# Patient Record
Sex: FEMALE | Race: WHITE | Employment: OTHER | URBAN - METROPOLITAN AREA
[De-identification: names, ages, dates, MRNs, and addresses within clinical notes are randomized per-mention and may not be internally consistent; named-entity substitution may affect disease eponyms.]

---

## 2017-01-01 ENCOUNTER — NURSING HOME VISIT (OUTPATIENT)
Dept: GERIATRICS | Facility: CLINIC | Age: 82
End: 2017-01-01
Payer: COMMERCIAL

## 2017-01-01 VITALS
DIASTOLIC BLOOD PRESSURE: 91 MMHG | RESPIRATION RATE: 16 BRPM | OXYGEN SATURATION: 95 % | WEIGHT: 129.7 LBS | HEART RATE: 91 BPM | TEMPERATURE: 98.9 F | SYSTOLIC BLOOD PRESSURE: 186 MMHG

## 2017-01-01 DIAGNOSIS — I71.40 ABDOMINAL AORTIC ANEURYSM (AAA) WITHOUT RUPTURE (H): ICD-10-CM

## 2017-01-01 DIAGNOSIS — R55 SYNCOPE, UNSPECIFIED SYNCOPE TYPE: ICD-10-CM

## 2017-01-01 DIAGNOSIS — G89.29 CHRONIC BILATERAL LOW BACK PAIN WITHOUT SCIATICA: ICD-10-CM

## 2017-01-01 DIAGNOSIS — I10 BENIGN ESSENTIAL HYPERTENSION: Primary | ICD-10-CM

## 2017-01-01 DIAGNOSIS — M54.50 CHRONIC BILATERAL LOW BACK PAIN WITHOUT SCIATICA: ICD-10-CM

## 2017-01-01 PROCEDURE — 99310 SBSQ NF CARE HIGH MDM 45: CPT | Performed by: NURSE PRACTITIONER

## 2017-01-01 RX ORDER — POLYETHYLENE GLYCOL 3350 17 G/17G
17 POWDER, FOR SOLUTION ORAL DAILY PRN
COMMUNITY

## 2017-01-01 RX ORDER — CYANOCOBALAMIN 1000 UG/ML
1 INJECTION, SOLUTION INTRAMUSCULAR; SUBCUTANEOUS
COMMUNITY
End: 2018-01-01

## 2017-12-21 NOTE — PROGRESS NOTES
[facility]  PRIMARY CARE PROVIDER AND CLINIC:  Francoise Whitmore [address] [place] [address] [address] / [place] [address]  Chief Complaint   Patient presents with     Hospital F/U     Clinic Care Coordination - Initial       HPI:    Leigh López is a 89 year old  (3/20/1925),admitted to the [facility]   from [facility].  Hospital stay 12/16/17 through 12/20/17 after a syncopal episode at home. Known hx of large AAA s/p repair x 2 but still problematic, HTN and recurrent sycope.  Admitted to this facility for  rehab, medical management and nursing care.  HPI information obtained from: facility chart records, facility staff, patient report, [facility] chart review and Care Everywhere [facility] chart review.  Current issues are:      Benign essential hypertension/Abdominal aortic aneurysm (AAA) without rupture (H)  Goal it to keep BP lower than 140 - acknowledge syncope episodoes may be hypoprofusion related and a higher bp would help with that - difficulty balancing    Syncope, unspecified syncope type  W/u un defined - goal is more comfort care    Chronic bilateral low back pain without sciatica  Chronic - acknowledge this might be AAA related but does appear more MS on exam.   Discussed pain medication treatment options.       CODE STATUS/ADVANCE DIRECTIVES DISCUSSION:   DNR / DNI  Patient's living condition: lives alone    ALLERGIES:Diltiazem hcl [diltiazem]; Metoprolol succinate [metoprolol]; and Sulfa drugs  PAST MEDICAL HISTORY:  has no past medical history on file.  PAST SURGICAL HISTORY:  has no past surgical history on file.  FAMILY HISTORY: family history is not on file.  SOCIAL HISTORY:      Post Discharge Medication Reconciliation Status: discharge medications reconciled, continue medications without change.  Current Outpatient Prescriptions   Medication Sig Dispense Refill     Irbesartan (AVAPRO PO) Take 150 mg by mouth daily       cyanocobalamin (VITAMIN  B12) 1000 MCG/ML injection Inject 1 mL into the muscle every 30 days       HYDRALAZINE HCL PO Take 100 mg by mouth 3 times daily       polyethylene glycol (MIRALAX/GLYCOLAX) powder Take 17 g by mouth daily as needed for constipation       Multiple Vitamins-Minerals (MULTIVITAMIN ADULTS PO) Take 1 tablet by mouth daily       Nitroglycerin (NITROSTAT SL) Place 0.4 mg under the tongue every 5 minutes as needed for chest pain       AmLODIPine Besylate (NORVASC PO) Take 10 mg by mouth daily       Calcium carb-Vitamin D 500 mg [place]-200 units (OSCAL WITH D;OYSTER SHELL CALCIUM) 500-200 MG-UNIT per tablet Take 1 tablet by mouth daily       Levothyroxine Sodium (SYNTHROID PO) Take 50 mcg by mouth daily       Acetaminophen (TYLENOL EXTRA STRENGTH PO) Take 1,000 mg by mouth 3 times daily Also QD PRN       Ascorbic Acid (VITAMIN C PO) Take 500 mg by mouth 2 times daily       Menthol, Topical Analgesic, (BIOFREEZE EX) Externally apply topically 2 times daily as needed (BID x 10 days)       CLONIDINE HCL PO Take 0.1 mg by mouth 2 times daily as needed         ROS:  10 point ROS of systems including Constitutional, Eyes, Respiratory, Cardiovascular, Gastroenterology, Genitourinary, Integumentary, Muscularskeletal, Psychiatric were all negative except for pertinent positives noted in my HPI.    Exam:  BP (!) 186/91  Pulse 91  Temp 98.9  F (37.2  C)  Resp 16  Wt 129 lb 11.2 oz (58.8 kg)  SpO2 95%  GENERAL APPEARANCE:  Alert, in no distress  RESP:  respiratory effort and palpation of chest normal, auscultation of lungs clear , no respiratory distress  CV:  Palpation and auscultation of heart done , rate and rhythm reg, 3/6murmur, no peripheral edema  ABDOMEN:  normal bowel sounds, soft, nontender, no hepatosplenomegaly or other masses  M/S:   Gait and station indep, Digits and nails normal  SKIN:  Inspection and Palpation of skin and subcutaneous tissue intact  NEURO: 2-12 in normal limits and at patient's baseline  PSYCH:   insight and judgement, memory good , affect and mood normal      Lab/Diagnostic data:  [facility]:  CBC WITH DIFFERENTIAL AND PLATELET COUNT  Collection Time: 12/16/17 4:00 PM  Result Value Ref Range  WBC COUNT 7.5 3.7 - 12.1  K/UL  RBC COUNT 3.88 3.76 - 5.39  M/UL  HEMOGLOBIN 11.3 11.2 - 15.8  GM/DL  HEMATOCRIT 33.3 (L) 33.9 - 47.5 %  MCV 85.7 80.6 - 98.5 FL  MCH 29.1 26.4 - 32.9 PG  MCHC 34.0 32.0 - 36.0 %  RDW 14.9 10.0 - 16.8 %  PLATELETS 242 179 - 450  K/UL  MPV 8.4 7.4 - 10.4 FL  % NEUTRO 85.8 (H) 43 - 80 %  % LYMPHS 7.5 (L) 16.0 - 49.0 %  % MONOCYTE 5.7 0.0 - 10.0 %  % EOS 0.5 0 - 7 %  % BASO 0.5 0 - 2 %  ABS NEUTROPHIL  (ANC)  6.4 1.8 - 9.2 K/UL  ABS LYMPH 0.6 (L) 1.2 - 3.9 K/UL  ABS MONOCYTE 0.4 0.0 - 1.1 K/UL  ABS EOS 0.0 0.0 - 0.8 K/UL  ABS BASO 0.0 0.0 - 0.2 K/UL    COMPREHENSIVE METABOLIC PANEL  Collection Time: 12/16/17 4:00 PM  Result Value Ref Range  GLUCOSE 124 (H) 70 - 100  MG/DL  BLOOD UREA  NITROGEN  21.9 (H) 10.0 - 20.0  MG/DL  CREATININE 1.52 (H) 0.57 - 1.11  MG/DL  GFR, EST (OTHER  RACES)  32  GFR,EST(  AMERICAN)  39  BUN/CREAT RATIO 14.4 11.7 - 22.9  SODIUM 135 (L) 136 - 146  MMOL/L  POTASSIUM 4.6 3.5 - 5.1  MMOL/L  CHLORIDE 103 98 - 107  MMOL/L  CO2 22.0  22.0 - 29.0  MMOL/L  CALCIUM 9.7 8.6 - 10.5  MG/DL  TOTAL PROTEIN 7.4 6.0 - 8.0  GM/DL  ALBUMIN 4.0 3.4 - 4.8  GM/DL  GLOBULIN 3.4 2.0 - 3.5  GM/DL  A/G RATIO 1.2 1.0 - 2.0  SGPT (ALT) 11 8 - 45 U/L  SGOT (AST) 15 5 - 41 U/L  ALK P'TASE 105 36 - 150 U/L  TOTAL BILI 0.5 0.2 - 1.2  MG/DL  ANION GAP 14.6 10.0 - 20.0    LIPASE  Collection Time: 12/16/17 4:00 PM  Result Value Ref Range  LIPASE 29 8 - 78 U/L  TROPONIN I  Collection Time: 12/16/17 4:00 PM  Result Value Ref Range  TROPONIN-I 0.03 0.00 - 0.29  NG/ML    URINE CHEM STRIP WITH REFLEX TO MICROSCOPIC  IF INDICATED  Collection Time: 12/16/17 6:39 PM  Result Value Ref Range  COLOR YELLOW  RUDY HAZY  UR SPECIFIC  GRAVITY  1.015 1.015 - 1.025  UR GLUCOSE NEGATIVE NEG  UR BILI  NEGATIVE NEG  KETONE NEGATIVE NEG  UR OCCULT BLOOD NEGATIVE NEG  URINE PH 5.0 5.0 - 7.0  UR ALBUMIN NEGATIVE NEG  UROBIL <2.0 <2.0 EU/DL  NITRITE NEGATIVE NEG  LEUKO EST 1+ (A) NEG    MICROSCOPIC URINE  Collection Time: 12/16/17 6:39 PM  Result Value Ref Range  UR WBC 20 (H) 0 - 5 /HPF  UR RBC 2 0 - 2 /HPF  BACTERIA TRACE (A) NEG /HPF  SQUAMOUS  EPITHELIAL  5 0 - 5 /HFP  MUCOUS THREADS OCCASIONAL /HPF  HYALINE CAST 11-25 (A) NEG /LPF    ASSESSMENT/PLAN:  Benign essential hypertension/Abdominal aortic aneurysm (AAA) without rupture (H)  Too high in TCU so far  Will increase hydralazine and add prn clonodine    Syncope, unspecified syncope type  Cont to monitor  PT eval    Chronic bilateral low back pain without sciatica  Will treat with tylenol and biofreeze       Orders:  1.  Start Tylenol 1000 mg po TID and 1000 mg po QD PRN.  Dx: pain/mild fever  2.  Start Biofreeze topical to back BID x 10 days, then BID PRN.  Dx: back pain  3.  Clarify Cranberry tab - 500 mg po BID.  Dx: UTI prevention  4.  Clarify fish oil/fatty acid - dose per pharmacy available  1200 mg or pharmacy equivalent.  Dx: supplement  5.  Nitro clarify  - give Q5 min x 3 PRN.  Dx: chest pain  6.  Increase Hydralazine to 100 mg po TID.  Dx: HTN  7.  Add/start Clonidine 0.1 mg po BID PRN - SBP > 150 when BP's checked on routine check.      Total time spent with patient visit at the skilled nursing facility was 40 min including patient visit, review of past records and phone call to patient contact. Greater than 50% of total time spent with counseling and coordinating care due to chart review     Call to son and message left with my direct call back # given.     Electronically signed by:  DAOMN Sepulveda CNP

## 2017-12-22 PROBLEM — E86.0 DEHYDRATION: Status: ACTIVE | Noted: 2017-01-01

## 2017-12-22 PROBLEM — R52 PAIN: Status: ACTIVE | Noted: 2017-01-01

## 2017-12-22 PROBLEM — R53.1 WEAKNESS: Status: ACTIVE | Noted: 2017-01-01

## 2017-12-22 PROBLEM — R07.9 CHEST PAIN: Status: ACTIVE | Noted: 2017-01-01

## 2017-12-22 PROBLEM — I10 ESSENTIAL (PRIMARY) HYPERTENSION: Status: ACTIVE | Noted: 2017-01-01

## 2017-12-22 PROBLEM — N17.9 ACUTE KIDNEY FAILURE (H): Status: ACTIVE | Noted: 2017-01-01

## 2017-12-22 PROBLEM — R00.1 BRADYCARDIA: Status: ACTIVE | Noted: 2017-01-01

## 2017-12-22 PROBLEM — R55 SYNCOPE AND COLLAPSE: Status: ACTIVE | Noted: 2017-01-01

## 2017-12-22 PROBLEM — Z86.79: Status: ACTIVE | Noted: 2017-01-01

## 2017-12-22 PROBLEM — R26.2 DIFFICULTY IN WALKING, NOT ELSEWHERE CLASSIFIED: Status: ACTIVE | Noted: 2017-01-01

## 2017-12-22 PROBLEM — K59.00 CONSTIPATION, UNSPECIFIED: Status: ACTIVE | Noted: 2017-01-01

## 2017-12-22 PROBLEM — E44.0 MODERATE PROTEIN-CALORIE MALNUTRITION (H): Status: ACTIVE | Noted: 2017-01-01

## 2017-12-22 PROBLEM — E03.9 HYPOTHYROIDISM: Status: ACTIVE | Noted: 2017-01-01

## 2017-12-22 PROBLEM — Z87.440 PERSONAL HISTORY OF URINARY TRACT INFECTION: Status: ACTIVE | Noted: 2017-01-01

## 2017-12-22 NOTE — LETTER
12/22/2017        RE: Leigh López  [address] [facility] [address]        [facility]  PRIMARY CARE PROVIDER AND CLINIC:  Francoise Whitmore [address] [place] [address] / [place] [address]  Chief Complaint   Patient presents with     Hospital F/U     Clinic Care Coordination - Initial       HPI:    Leigh López is a 89 year old  (3/20/1925),admitted to the [facility]   from [facility].  Hospital stay 12/16/17 through 12/20/17 after a syncopal episode at home. Known hx of large AAA s/p repair x 2 but still problematic, HTN and recurrent sycope.  Admitted to this facility for  rehab, medical management and nursing care.  HPI information obtained from: facility chart records, facility staff, patient report, [facility] chart review and Care Everywhere [facility] chart review.  Current issues are:      Benign essential hypertension/Abdominal aortic aneurysm (AAA) without rupture (H)  Goal it to keep BP lower than 140 - acknowledge syncope episodoes may be hypoprofusion related and a higher bp would help with that - difficulty balancing    Syncope, unspecified syncope type  W/u un defined - goal is more comfort care    Chronic bilateral low back pain without sciatica  Chronic - acknowledge this might be AAA related but does appear more MS on exam.   Discussed pain medication treatment options.       CODE STATUS/ADVANCE DIRECTIVES DISCUSSION:   DNR / DNI  Patient's living condition: lives alone    ALLERGIES:Diltiazem hcl [diltiazem]; Metoprolol succinate [metoprolol]; and Sulfa drugs  PAST MEDICAL HISTORY:  has no past medical history on file.  PAST SURGICAL HISTORY:  has no past surgical history on file.  FAMILY HISTORY: family history is not on file.  SOCIAL HISTORY:      Post Discharge Medication Reconciliation Status: discharge medications reconciled, continue medications without change.  Current Outpatient Prescriptions   Medication Sig Dispense Refill      Irbesartan (AVAPRO PO) Take 150 mg by mouth daily       cyanocobalamin (VITAMIN B12) 1000 MCG/ML injection Inject 1 mL into the muscle every 30 days       HYDRALAZINE HCL PO Take 100 mg by mouth 3 times daily       polyethylene glycol (MIRALAX/GLYCOLAX) powder Take 17 g by mouth daily as needed for constipation       Multiple Vitamins-Minerals (MULTIVITAMIN ADULTS PO) Take 1 tablet by mouth daily       Nitroglycerin (NITROSTAT SL) Place 0.4 mg under the tongue every 5 minutes as needed for chest pain       AmLODIPine Besylate (NORVASC PO) Take 10 mg by mouth daily       Calcium carb-Vitamin D 500 mg [place]-200 units (OSCAL WITH D;OYSTER SHELL CALCIUM) 500-200 MG-UNIT per tablet Take 1 tablet by mouth daily       Levothyroxine Sodium (SYNTHROID PO) Take 50 mcg by mouth daily       Acetaminophen (TYLENOL EXTRA STRENGTH PO) Take 1,000 mg by mouth 3 times daily Also QD PRN       Ascorbic Acid (VITAMIN C PO) Take 500 mg by mouth 2 times daily       Menthol, Topical Analgesic, (BIOFREEZE EX) Externally apply topically 2 times daily as needed (BID x 10 days)       CLONIDINE HCL PO Take 0.1 mg by mouth 2 times daily as needed         ROS:  10 point ROS of systems including Constitutional, Eyes, Respiratory, Cardiovascular, Gastroenterology, Genitourinary, Integumentary, Muscularskeletal, Psychiatric were all negative except for pertinent positives noted in my HPI.    Exam:  BP (!) 186/91  Pulse 91  Temp 98.9  F (37.2  C)  Resp 16  Wt 129 lb 11.2 oz (58.8 kg)  SpO2 95%  GENERAL APPEARANCE:  Alert, in no distress  RESP:  respiratory effort and palpation of chest normal, auscultation of lungs clear , no respiratory distress  CV:  Palpation and auscultation of heart done , rate and rhythm reg, 3/6murmur, no peripheral edema  ABDOMEN:  normal bowel sounds, soft, nontender, no hepatosplenomegaly or other masses  M/S:   Gait and station indep, Digits and nails normal  SKIN:  Inspection and Palpation of skin and subcutaneous  tissue intact  NEURO: 2-12 in normal limits and at patient's baseline  PSYCH:  insight and judgement, memory good , affect and mood normal      Lab/Diagnostic data:  [facility]:  CBC WITH DIFFERENTIAL AND PLATELET COUNT  Collection Time: 12/16/17 4:00 PM  Result Value Ref Range  WBC COUNT 7.5 3.7 - 12.1  K/UL  RBC COUNT 3.88 3.76 - 5.39  M/UL  HEMOGLOBIN 11.3 11.2 - 15.8  GM/DL  HEMATOCRIT 33.3 (L) 33.9 - 47.5 %  MCV 85.7 80.6 - 98.5 FL  MCH 29.1 26.4 - 32.9 PG  MCHC 34.0 32.0 - 36.0 %  RDW 14.9 10.0 - 16.8 %  PLATELETS 242 179 - 450  K/UL  MPV 8.4 7.4 - 10.4 FL  % NEUTRO 85.8 (H) 43 - 80 %  % LYMPHS 7.5 (L) 16.0 - 49.0 %  % MONOCYTE 5.7 0.0 - 10.0 %  % EOS 0.5 0 - 7 %  % BASO 0.5 0 - 2 %  ABS NEUTROPHIL  (ANC)  6.4 1.8 - 9.2 K/UL  ABS LYMPH 0.6 (L) 1.2 - 3.9 K/UL  ABS MONOCYTE 0.4 0.0 - 1.1 K/UL  ABS EOS 0.0 0.0 - 0.8 K/UL  ABS BASO 0.0 0.0 - 0.2 K/UL    COMPREHENSIVE METABOLIC PANEL  Collection Time: 12/16/17 4:00 PM  Result Value Ref Range  GLUCOSE 124 (H) 70 - 100  MG/DL  BLOOD UREA  NITROGEN  21.9 (H) 10.0 - 20.0  MG/DL  CREATININE 1.52 (H) 0.57 - 1.11  MG/DL  GFR, EST (OTHER  RACES)  32  GFR,EST(  AMERICAN)  39  BUN/CREAT RATIO 14.4 11.7 - 22.9  SODIUM 135 (L) 136 - 146  MMOL/L  POTASSIUM 4.6 3.5 - 5.1  MMOL/L  CHLORIDE 103 98 - 107  MMOL/L  CO2 22.0  22.0 - 29.0  MMOL/L  CALCIUM 9.7 8.6 - 10.5  MG/DL  TOTAL PROTEIN 7.4 6.0 - 8.0  GM/DL  ALBUMIN 4.0 3.4 - 4.8  GM/DL  GLOBULIN 3.4 2.0 - 3.5  GM/DL  A/G RATIO 1.2 1.0 - 2.0  SGPT (ALT) 11 8 - 45 U/L  SGOT (AST) 15 5 - 41 U/L  ALK P'TASE 105 36 - 150 U/L  TOTAL BILI 0.5 0.2 - 1.2  MG/DL  ANION GAP 14.6 10.0 - 20.0    LIPASE  Collection Time: 12/16/17 4:00 PM  Result Value Ref Range  LIPASE 29 8 - 78 U/L  TROPONIN I  Collection Time: 12/16/17 4:00 PM  Result Value Ref Range  TROPONIN-I 0.03 0.00 - 0.29  NG/ML    URINE CHEM STRIP WITH REFLEX TO MICROSCOPIC  IF INDICATED  Collection Time: 12/16/17 6:39 PM  Result Value Ref Range  COLOR YELLOW  RUDY HAZY  UR  SPECIFIC  GRAVITY  1.015 1.015 - 1.025  UR GLUCOSE NEGATIVE NEG  UR BILI NEGATIVE NEG  KETONE NEGATIVE NEG  UR OCCULT BLOOD NEGATIVE NEG  URINE PH 5.0 5.0 - 7.0  UR ALBUMIN NEGATIVE NEG  UROBIL <2.0 <2.0 EU/DL  NITRITE NEGATIVE NEG  LEUKO EST 1+ (A) NEG    MICROSCOPIC URINE  Collection Time: 12/16/17 6:39 PM  Result Value Ref Range  UR WBC 20 (H) 0 - 5 /HPF  UR RBC 2 0 - 2 /HPF  BACTERIA TRACE (A) NEG /HPF  SQUAMOUS  EPITHELIAL  5 0 - 5 /HFP  MUCOUS THREADS OCCASIONAL /HPF  HYALINE CAST 11-25 (A) NEG /LPF    ASSESSMENT/PLAN:  Benign essential hypertension/Abdominal aortic aneurysm (AAA) without rupture (H)  Too high in TCU so far  Will increase hydralazine and add prn clonodine    Syncope, unspecified syncope type  Cont to monitor  PT eval    Chronic bilateral low back pain without sciatica  Will treat with tylenol and biofreeze       Orders:  1.  Start Tylenol 1000 mg po TID and 1000 mg po QD PRN.  Dx: pain/mild fever  2.  Start Biofreeze topical to back BID x 10 days, then BID PRN.  Dx: back pain  3.  Clarify Cranberry tab - 500 mg po BID.  Dx: UTI prevention  4.  Clarify fish oil/fatty acid - dose per pharmacy available  1200 mg or pharmacy equivalent.  Dx: supplement  5.  Nitro clarify  - give Q5 min x 3 PRN.  Dx: chest pain  6.  Increase Hydralazine to 100 mg po TID.  Dx: HTN  7.  Add/start Clonidine 0.1 mg po BID PRN - SBP > 150 when BP's checked on routine check.      Total time spent with patient visit at the skilled nursing facility was 40 min including patient visit, review of past records and phone call to patient contact. Greater than 50% of total time spent with counseling and coordinating care due to chart review     Call to son and message left with my direct call back # given.     Electronically signed by:  DAMON Sepulveda CNP                    Sincerely,        DAMON Sepulveda CNP

## 2018-01-01 ENCOUNTER — NURSING HOME VISIT (OUTPATIENT)
Dept: GERIATRICS | Facility: CLINIC | Age: 83
End: 2018-01-01
Payer: COMMERCIAL

## 2018-01-01 ENCOUNTER — DOCUMENTATION ONLY (OUTPATIENT)
Dept: CARE COORDINATION | Facility: CLINIC | Age: 83
End: 2018-01-01

## 2018-01-01 VITALS
HEART RATE: 95 BPM | OXYGEN SATURATION: 61 % | DIASTOLIC BLOOD PRESSURE: 50 MMHG | BODY MASS INDEX: 21.81 KG/M2 | SYSTOLIC BLOOD PRESSURE: 121 MMHG | TEMPERATURE: 97.6 F | RESPIRATION RATE: 16 BRPM | WEIGHT: 123.1 LBS

## 2018-01-01 VITALS
SYSTOLIC BLOOD PRESSURE: 159 MMHG | HEART RATE: 59 BPM | DIASTOLIC BLOOD PRESSURE: 71 MMHG | RESPIRATION RATE: 18 BRPM | TEMPERATURE: 97.2 F | WEIGHT: 123.1 LBS

## 2018-01-01 VITALS
WEIGHT: 123.1 LBS | HEART RATE: 71 BPM | DIASTOLIC BLOOD PRESSURE: 70 MMHG | SYSTOLIC BLOOD PRESSURE: 146 MMHG | RESPIRATION RATE: 20 BRPM | BODY MASS INDEX: 21.81 KG/M2 | TEMPERATURE: 97.5 F

## 2018-01-01 VITALS
HEART RATE: 47 BPM | WEIGHT: 124 LBS | OXYGEN SATURATION: 97 % | TEMPERATURE: 97.4 F | RESPIRATION RATE: 16 BRPM | SYSTOLIC BLOOD PRESSURE: 134 MMHG | DIASTOLIC BLOOD PRESSURE: 51 MMHG

## 2018-01-01 VITALS
DIASTOLIC BLOOD PRESSURE: 47 MMHG | TEMPERATURE: 98 F | WEIGHT: 123.1 LBS | HEIGHT: 63 IN | SYSTOLIC BLOOD PRESSURE: 104 MMHG | RESPIRATION RATE: 18 BRPM | HEART RATE: 59 BPM | OXYGEN SATURATION: 61 % | BODY MASS INDEX: 21.81 KG/M2

## 2018-01-01 VITALS
SYSTOLIC BLOOD PRESSURE: 127 MMHG | RESPIRATION RATE: 16 BRPM | HEART RATE: 76 BPM | WEIGHT: 126.7 LBS | TEMPERATURE: 98.1 F | DIASTOLIC BLOOD PRESSURE: 57 MMHG

## 2018-01-01 DIAGNOSIS — I10 BENIGN ESSENTIAL HYPERTENSION: ICD-10-CM

## 2018-01-01 DIAGNOSIS — Z51.5 HOSPICE CARE: ICD-10-CM

## 2018-01-01 DIAGNOSIS — Z53.9 ERRONEOUS ENCOUNTER--DISREGARD: Primary | ICD-10-CM

## 2018-01-01 DIAGNOSIS — I71.40 ABDOMINAL AORTIC ANEURYSM (AAA) WITHOUT RUPTURE (H): Primary | ICD-10-CM

## 2018-01-01 DIAGNOSIS — I10 ESSENTIAL (PRIMARY) HYPERTENSION: Primary | ICD-10-CM

## 2018-01-01 DIAGNOSIS — F51.02 ADJUSTMENT INSOMNIA: ICD-10-CM

## 2018-01-01 DIAGNOSIS — Z71.89 ADVANCED DIRECTIVES, COUNSELING/DISCUSSION: ICD-10-CM

## 2018-01-01 DIAGNOSIS — I71.40 ABDOMINAL AORTIC ANEURYSM (AAA) WITHOUT RUPTURE (H): ICD-10-CM

## 2018-01-01 DIAGNOSIS — K92.0 HEMATEMESIS WITH NAUSEA: ICD-10-CM

## 2018-01-01 DIAGNOSIS — M54.50 CHRONIC BILATERAL LOW BACK PAIN WITHOUT SCIATICA: ICD-10-CM

## 2018-01-01 DIAGNOSIS — L29.9 PRURITIC DISORDER: Primary | ICD-10-CM

## 2018-01-01 DIAGNOSIS — R55 SYNCOPE, UNSPECIFIED SYNCOPE TYPE: Primary | ICD-10-CM

## 2018-01-01 DIAGNOSIS — R55 SYNCOPE, UNSPECIFIED SYNCOPE TYPE: ICD-10-CM

## 2018-01-01 DIAGNOSIS — G89.29 CHRONIC BILATERAL LOW BACK PAIN WITHOUT SCIATICA: ICD-10-CM

## 2018-01-01 DIAGNOSIS — E03.9 HYPOTHYROIDISM, UNSPECIFIED TYPE: ICD-10-CM

## 2018-01-01 PROCEDURE — 99306 1ST NF CARE HIGH MDM 50: CPT | Performed by: FAMILY MEDICINE

## 2018-01-01 PROCEDURE — 99497 ADVNCD CARE PLAN 30 MIN: CPT | Mod: GW | Performed by: NURSE PRACTITIONER

## 2018-01-01 PROCEDURE — 99309 SBSQ NF CARE MODERATE MDM 30: CPT | Performed by: NURSE PRACTITIONER

## 2018-01-01 PROCEDURE — 99308 SBSQ NF CARE LOW MDM 20: CPT | Performed by: NURSE PRACTITIONER

## 2018-01-01 RX ORDER — PROCHLORPERAZINE 25 MG
25 SUPPOSITORY, RECTAL RECTAL EVERY 12 HOURS PRN
COMMUNITY

## 2018-01-01 RX ORDER — ACETAMINOPHEN 650 MG/1
650 SUPPOSITORY RECTAL EVERY 4 HOURS PRN
COMMUNITY

## 2018-01-01 RX ORDER — MORPHINE SULFATE 20 MG/ML
5 SOLUTION ORAL
COMMUNITY

## 2018-01-01 RX ORDER — CHLORAL HYDRATE 500 MG
2 CAPSULE ORAL DAILY
COMMUNITY
End: 2018-01-01

## 2018-01-01 RX ORDER — MORPHINE SULFATE 20 MG/ML
5 SOLUTION ORAL EVERY 4 HOURS
COMMUNITY

## 2018-01-03 NOTE — PROGRESS NOTES
[place] [facility]  PRIMARY CARE PROVIDER AND CLINIC:  Francoise Whitmore [address] [place] [address] [address] / [place] [address]  Chief Complaint   Patient presents with     Hospital F/U     Clinic Care Coordination - Initial       HPI:    Leigh López is a 89 year old  (3/20/1925),admitted to the [facility]   from [facility].  Hospital stay 12/16/17 through 12/20/17.  Admitted to this facility for  rehab, medical management and nursing care.  HPI information obtained from: facility staff and patient report.      Interval History:  - Pt with significant PMH of large AAA s/p repair x 2 but still problematic, HTN and recurrent syncope admitted to the above hospital for syncope, w/up negative. transferred here for  medical continuing medical management and rehab.  with goal of more comfort care.     Denies any dizziness, drowsiness, chest pain, palpitation or WATKINS.   - Reports pain in the abdomen from her aneurysm, sometimes it hurts, comes and goes. The pain intensity varies, resolves spontaneously. denies nausea or vomiting. Reports BM is fine  - reports back pain is manageable, aching in nature.   - reports sleep and appetite is fine.  - ambulates independently using 4WW.    CODE STATUS/ADVANCE DIRECTIVES DISCUSSION:   DNR / DNI    ALLERGIES:Diltiazem hcl [diltiazem]; Metoprolol succinate [metoprolol]; and Sulfa drugs  PAST MEDICAL HISTORY:   Basal cell carcinoma   left medial canthus-mohs surgery   Actinic keratosis   multiple   Degenerative skin disorder   dermatoheliosis   Heart disease       Hypertension       S/P AAA (abdominal aortic aneurysm) repair       PAST SURGICAL HISTORY:   PLACEMENT ENDOLUMINAL AORTIC GRAFT 7/23/07     FAMILY HISTORY: non contributory  SOCIAL HISTORY:     Tobacco Use Types Packs/Day Years Used Date   Never Smoker           Smokeless Tobacco: Never Used           Alcohol Use Drinks/Week oz/Week Comments   No           Sex Assigned at Birth        Post Discharge Medication Reconciliation Status: discharge medications reconciled and changed, per note/orders (see AVS).  Current Outpatient Prescriptions   Medication Sig Dispense Refill     CRANBERRY EXTRACT PO Take 500 mg by mouth daily       fish oil-omega-3 fatty acids 1000 MG capsule Take 2 g by mouth daily       Irbesartan (AVAPRO PO) Take 150 mg by mouth daily       cyanocobalamin (VITAMIN B12) 1000 MCG/ML injection Inject 1 mL into the muscle every 30 days       HYDRALAZINE HCL PO Take 100 mg by mouth 3 times daily       polyethylene glycol (MIRALAX/GLYCOLAX) powder Take 17 g by mouth daily as needed for constipation       Multiple Vitamins-Minerals (MULTIVITAMIN ADULTS PO) Take 1 tablet by mouth daily       Nitroglycerin (NITROSTAT SL) Place 0.4 mg under the tongue every 5 minutes as needed for chest pain       Calcium carb-Vitamin D 500 mg [place]-200 units (OSCAL WITH D;OYSTER SHELL CALCIUM) 500-200 MG-UNIT per tablet Take 1 tablet by mouth daily       Levothyroxine Sodium (SYNTHROID PO) Take 50 mcg by mouth daily       Acetaminophen (TYLENOL EXTRA STRENGTH PO) Take 1,000 mg by mouth 3 times daily Also QD PRN       Ascorbic Acid (VITAMIN C PO) Take 500 mg by mouth 2 times daily       Menthol, Topical Analgesic, (BIOFREEZE EX) Externally apply topically 2 times daily as needed        CLONIDINE HCL PO Take 0.1 mg by mouth 2 times daily as needed       AmLODIPine Besylate (NORVASC PO) Take 10 mg by mouth daily         ROS:  10 point ROS of systems including Constitutional, Eyes, Respiratory, Cardiovascular, Gastroenterology, Genitourinary, Integumentary, Muscularskeletal, Psychiatric were all negative except for pertinent positives noted in my HPI.    Exam:  /51  Pulse (!) 47  Temp 97.4  F (36.3  C)  Resp 16  Wt 124 lb (56.2 kg)  SpO2 97%  GENERAL APPEARANCE:  in no distress, cooperative  ENT:  [place], hearing aids in place, dry nasal mucosa, moist oral mucosa.   EYES:  EOM, conjunctivae, lids,  pupils and irises normal  NECK:  No adenopathy,masses or thyromegaly  RESP:  respiratory effort and palpation of chest normal, lungs clear to auscultation , no respiratory distress  CV:  Palpation and auscultation of heart done , regular rate and rhythm, no murmur, rub, or gallop, no abdominal aortic pulsation appreciated, or murmur.   ABDOMEN:  normal bowel sounds, soft, nontender, no hepatosplenomegaly or other masses, BS hyperactive  M/S:   Gait and station abnormal uses 4WW. slight thoracic kyphosis.   SKIN:  thin and dry, some ecchymosis over dorsum of hands.   NEURO:   [place], rest of CN grossly normal.   PSYCH:  oriented X 3, affect and mood normal    Lab/Diagnostic data:  [facility]:  CBC WITH DIFFERENTIAL AND PLATELET COUNT  Collection Time: 12/16/17 4:00 PM  Result Value Ref Range  WBC COUNT 7.5 3.7 - 12.1  K/UL  RBC COUNT 3.88 3.76 - 5.39  M/UL  HEMOGLOBIN 11.3 11.2 - 15.8  GM/DL  HEMATOCRIT 33.3 (L) 33.9 - 47.5 %  MCV 85.7 80.6 - 98.5 FL  MCH 29.1 26.4 - 32.9 PG  MCHC 34.0 32.0 - 36.0 %  RDW 14.9 10.0 - 16.8 %  PLATELETS 242 179 - 450  K/UL  MPV 8.4 7.4 - 10.4 FL  % NEUTRO 85.8 (H) 43 - 80 %  % LYMPHS 7.5 (L) 16.0 - 49.0 %  % MONOCYTE 5.7 0.0 - 10.0 %  % EOS 0.5 0 - 7 %  % BASO 0.5 0 - 2 %  ABS NEUTROPHIL  (ANC)  6.4 1.8 - 9.2 K/UL  ABS LYMPH 0.6 (L) 1.2 - 3.9 K/UL  ABS MONOCYTE 0.4 0.0 - 1.1 K/UL  ABS EOS 0.0 0.0 - 0.8 K/UL  ABS BASO 0.0 0.0 - 0.2 K/UL     COMPREHENSIVE METABOLIC PANEL  Collection Time: 12/16/17 4:00 PM  Result Value Ref Range  GLUCOSE 124 (H) 70 - 100  MG/DL  BLOOD UREA  NITROGEN  21.9 (H) 10.0 - 20.0  MG/DL  CREATININE 1.52 (H) 0.57 - 1.11  MG/DL  GFR, EST (OTHER RACES) 32  GFR,EST(  AMERICAN)  39  BUN/CREAT RATIO 14.4 11.7 - 22.9  SODIUM 135 (L) 136 - 146  MMOL/L  POTASSIUM 4.6 3.5 - 5.1  MMOL/L  CHLORIDE 103 98 - 107  MMOL/L  CO2 22.0  22.0 - 29.0  MMOL/L  CALCIUM 9.7 8.6 - 10.5  MG/DL  TOTAL PROTEIN 7.4 6.0 - 8.0  GM/DL  ALBUMIN 4.0 3.4 - 4.8  GM/DL  GLOBULIN 3.4 2.0 -  3.5  GM/DL  A/G RATIO 1.2 1.0 - 2.0  SGPT (ALT) 11 8 - 45 U/L  SGOT (AST) 15 5 - 41 U/L  ALK P'TASE 105 36 - 150 U/L  TOTAL BILI 0.5 0.2 - 1.2  MG/DL  ANION GAP 14.6 10.0 - 20.0     LIPASE  Collection Time: 12/16/17 4:00 PM  Result Value Ref Range  LIPASE 29 8 - 78 U/L  TROPONIN I  Collection Time: 12/16/17 4:00 PM  Result Value Ref Range  TROPONIN-I 0.03 0.00 - 0.29  NG/ML     URINE CHEM STRIP WITH REFLEX TO MICROSCOPIC  IF INDICATED  Collection Time: 12/16/17 6:39 PM  Result Value Ref Range  COLOR YELLOW  RUDY HAZY  UR SPECIFIC  GRAVITY  1.015 1.015 - 1.025  UR GLUCOSE NEGATIVE NEG  UR BILI NEGATIVE NEG  KETONE NEGATIVE NEG  UR OCCULT BLOOD NEGATIVE NEG  URINE PH 5.0 5.0 - 7.0  UR ALBUMIN NEGATIVE NEG  UROBIL <2.0 <2.0 EU/DL  NITRITE NEGATIVE NEG  LEUKO EST 1+ (A) NEG     MICROSCOPIC URINE  Collection Time: 12/16/17 6:39 PM  Result Value Ref Range  UR WBC 20 (H) 0 - 5 /HPF  UR RBC 2 0 - 2 /HPF  BACTERIA TRACE (A) NEG /HPF  SQUAMOUS  EPITHELIAL  5 0 - 5 /HFP  MUCOUS THREADS OCCASIONAL /HPF  HYALINE CAST 11-25 (A) NEG /LPF     ASSESSMENT/PLAN:  Syncope, unspecified syncope type  - idiopathic etilogy  - currently stable.   - goal focused comfort care    Benign essential hypertension  BP Readings from Last 3 Encounters:   01/03/18 134/51   12/21/17 (!) 186/91   - hydralazin increased to 100 mg tid (max dose). May cause SLE-type syndrome at dose > 200 mg. Monitor for joint/chest pain or fever.   - on amlodipine 10 mg, clonidine 0.1 mg added prn bid (may cause reflex hypertension)  -     Abdominal aortic aneurysm (AAA) without rupture (H)  - keep SBP < 130 mmHg.   - no data in the EHR regarding the size, however, surgical intervention risk may outweighs the benefit  - leakage could cause back-pain, however, may present acutely, and represent an urgency case vs emergency.     Chronic bilateral low back pain without sciatica  - analgesia optimal.   - encourage safe ambulation to minimize deconditioning.      Hypothyroidism, unspecified type  - No results found for: TSH]  - on LT4  - in this age group keep TSH around 6       Physical Deconditioning:  - improving with therapy.     Orders:  - See above, otherwise, continue the rest of the current POC.       Electronically signed by:  Sena Mtz MD

## 2018-01-16 NOTE — PROGRESS NOTES
[place] [facility]  Chief Complaint   Patient presents with     Nursing Home Acute     HPI:    Leigh López is a 89 year old  (3/20/1925), who is being seen today for an episodic care visit at [facility].    HPI information obtained from: facility chart records, facility staff, patient report and [facility] chart review. Today's concern is:    Pruritic disorder  Patient c/o itching on arms and legs. Started a few days ago, reported to provider by nursing. Family planning on bringing in lotion from home. Denies fevers/chills/rash.    ACP  Noted large AAA with HTN  Trying to keep BP low   Larger picture of pt failure to thrive. Had a reoccurant syncopal episode - family updated and goals of care to be kept at SNF with more palliative care   PRN clonidine used 1/15 am    REVIEW OF SYSTEMS:  4 point ROS including Respiratory, CV, GI and , other than that noted in the HPI,  is negative- less reponsive than past visit.     PMH/PSH reviewed in EPIC today    /57  Pulse 76  Temp 98.1  F (36.7  C)  Resp 16  Wt 126 lb 11.2 oz (57.5 kg)     EXAM:  Constitutional: alert, active, no distress and cooperative   Cardiovascular: PMI normal. No lifts, heaves, or thrills. RRR. Grade 2/6 systolic murmur. No clicks gallops or rub  Respiratory: negative, Percussion normal. Good diaphragmatic excursion. Lungs clear  SKIN: no suspicious lesions or rashes and no excoriation noted.    ASSESSMENT/PLAN:    Pruritic disorder  Suspect dry skin given season and lack of clinical correlation to pathologic pruritis.    ACP/AAA/HTN Reached out to son Frandy by phone and left a message asking them to call me back. My direct cell # given. Continue with POC.  Will offer hospice.     Electronically signed by:  Gia Hernandez RN BSN CCRN (NP Student).  This patient was seen along with a nurse practitioner student. The histories and ROS were obtained and confirmed by myself. All objective information and Assessment/Plans  were completed by myself.    DAMON Sepulveda CNP

## 2018-01-16 NOTE — LETTER
1/16/2018        RE: Leigh López  [address] [address]        [place] [facility]  Chief Complaint   Patient presents with     Nursing Home Acute     HPI:    Leigh López is a 89 year old  (3/20/1925), who is being seen today for an episodic care visit at [facility].    HPI information obtained from: facility chart records, facility staff, patient report and [facility] chart review. Today's concern is:    Pruritic disorder  Patient c/o itching on arms and legs. Started a few days ago, reported to provider by nursing. Family planning on bringing in lotion from home. Denies fevers/chills/rash.    ACP  Noted large AAA with HTN  Trying to keep BP low   Larger picture of pt failure to thrive. Had a reoccurant syncopal episode - family updated and goals of care to be kept at SNF with more palliative care   PRN clonidine used 1/15 am    REVIEW OF SYSTEMS:  4 point ROS including Respiratory, CV, GI and , other than that noted in the HPI,  is negative- less reponsive than past visit.     PMH/PSH reviewed in EPIC today    /57  Pulse 76  Temp 98.1  F (36.7  C)  Resp 16  Wt 126 lb 11.2 oz (57.5 kg)     EXAM:  Constitutional: alert, active, no distress and cooperative   Cardiovascular: PMI normal. No lifts, heaves, or thrills. RRR. Grade 2/6 systolic murmur. No clicks gallops or rub  Respiratory: negative, Percussion normal. Good diaphragmatic excursion. Lungs clear  SKIN: no suspicious lesions or rashes and no excoriation noted.    ASSESSMENT/PLAN:    Pruritic disorder  Suspect dry skin given season and lack of clinical correlation to pathologic pruritis.    ACP/AAA/HTN Reached out to son Frandy by phone and left a message asking them to call me back. My direct cell # given. Continue with POC.  Will offer hospice.     Electronically signed by:  Gia Hernandez RN BSN CCRN (NP Student).  This patient was seen along with a nurse practitioner student. The histories and ROS were  obtained and confirmed by myself. All objective information and Assessment/Plans were completed by myself.    DAMON Sepulveda CNP      Sincerely,        DAMON Sepulveda CNP

## 2018-01-16 NOTE — Clinical Note
The aCP/AAA/HTN  Was for me to f/u on -  Just wanted you to see how I added that in the documentation.

## 2018-01-22 NOTE — PROGRESS NOTES
"[place] [facility]    Chief Complaint   Patient presents with     Nursing Home Acute       HPI:    Leigh López is a 89 year old  (3/20/1925), who is being seen today for an episodic care visit at [facility].  HPI information obtained from: facility chart records, facility staff, patient report, [facility] chart review and family/first contact sonshon report. Today's concern is:     Abdominal aortic aneurysm (AAA) without rupture (H)  Syncope, unspecified syncope type  Advanced directives, counseling/discussion     - pt having subsequent \"spells\" where she is unresponsive over weekend and again today.   Family notes they are moving her to LTC here at [place] - that AL is no longer feasible and want more palliative care given AAA>   Offered hospice services during call.       REVIEW OF SYSTEMS:  Unobtainable secondary to cognitive impairment or aphasia, but today pt reports no pain    /71  Pulse 59  Temp 97.2  F (36.2  C)  Resp 18  Wt 123 lb 1.6 oz (55.8 kg)  GENERAL APPEARANCE:  Alert, in no distress, frail, self propelling in W/c -   Non labored breathing, scant edema in bill ext.     ASSESSMENT/PLAN:  Abdominal aortic aneurysm (AAA) without rupture (H)/Syncope, unspecified syncope type/Advanced directives, counseling/discussion  Given AAA increase noted in last hospitalization and probability of survival rate in 6 m to 1 year being very low - given her recent syncope - low BP's -=but need to keep BP's low given AAA - the most effective treatment option is hospice and comfort care.   Son Shon agrees and wants to hear more about hospice and how that works in an SNF.  I have discussed the potential for home care and hospice needs. I have let family know they have choice in the company they select. I recommend [facility] as staying in the same system greater facilitates communication.     No change in BP meds given swings.   - HOSPICE REFERRAL  If/once signs on - we will then back down on " non essential meds (supplements)      Total time spent with patient visit at the skilled nursing facility was 25 min including patient visit, review of past records and phone call to patient contact. Greater than 50% of total time spent with counseling and coordinating care due to 16 min call with son about ACP/hospice    Electronically signed by:  DAMON Sepulveda CNP

## 2018-01-22 NOTE — LETTER
"    1/22/2018        RE: Leigh López  [address] [address]        [place] [facility]    Chief Complaint   Patient presents with     Nursing Home Acute       HPI:    Leigh López is a 89 year old  (3/20/1925), who is being seen today for an episodic care visit at [facility].  HPI information obtained from: facility chart records, facility staff, patient report, [facility] chart review and family/first contact sonshon report. Today's concern is:     Abdominal aortic aneurysm (AAA) without rupture (H)  Syncope, unspecified syncope type  Advanced directives, counseling/discussion     - pt having subsequent \"spells\" where she is unresponsive over weekend and again today.   Family notes they are moving her to LTC here at [place] - that AL is no longer feasible and want more palliative care given AAA>   Offered hospice services during call.       REVIEW OF SYSTEMS:  Unobtainable secondary to cognitive impairment or aphasia, but today pt reports no pain    /71  Pulse 59  Temp 97.2  F (36.2  C)  Resp 18  Wt 123 lb 1.6 oz (55.8 kg)  GENERAL APPEARANCE:  Alert, in no distress, frail, self propelling in W/c -   Non labored breathing, scant edema in bill ext.     ASSESSMENT/PLAN:  Abdominal aortic aneurysm (AAA) without rupture (H)/Syncope, unspecified syncope type/Advanced directives, counseling/discussion  Given AAA increase noted in last hospitalization and probability of survival rate in 6 m to 1 year being very low - given her recent syncope - low BP's -=but need to keep BP's low given AAA - the most effective treatment option is hospice and comfort care.   Son Shon agrees and wants to hear more about hospice and how that works in an SNF.  I have discussed the potential for home care and hospice needs. I have let family know they have choice in the company they select. I recommend [facility] as staying in the same system greater facilitates communication.     No change in BP meds " given swings.   - HOSPICE REFERRAL  If/once signs on - we will then back down on non essential meds (supplements)      Total time spent with patient visit at the skilled nursing facility was 25 min including patient visit, review of past records and phone call to patient contact. Greater than 50% of total time spent with counseling and coordinating care due to 16 min call with son about ACP/hospice    Electronically signed by:  DAMON Sepulveda CNP      Sincerely,        DAMON Sepulveda CNP

## 2018-01-26 NOTE — LETTER
"    1/26/2018        RE: Leigh López  [address] [address]        [place] [facility]  Chief Complaint   Patient presents with     Nursing Home Acute     HPI:    Leigh López is a 89 year old  (3/20/1925), who is being seen today for an episodic care visit at [facility].    HPI information obtained from: facility chart records, facility staff, patient report, [facility] chart review and Care Everywhere [facility] chart review. Today's concern is:    Essential (primary) hypertension  Noting goal for BP control is <140/90 due to AAA. Chart review shows Norvasc has been on hold since readmission, while Hydralazine is 100mg scheduled TID. PRN Clonidine is available for SBP>150 but has not been utilized.  Patient continues to have transient orthostatic episodes of hypotension. Today patient denies dizziness, but does states that sometimes she has mild abdominal pain.  Denies nausea, constipation, fevers, SOB/CP, cough. Chart review shows BP as follows:  01/26/2018 14:39 Blood Pressure: 135 / 66 mmHg   01/25/2018 10:17 Blood Pressure: 146 / 70 mmHg   01/24/2018 10:03 Blood Pressure: 104 / 47 mmHg   01/23/2018 09:01 Blood Pressure: 153 / 65 mmHg   01/22/2018 14:05 Blood Pressure: 159 / 71 mmHg   01/22/2018 10:11 Blood Pressure: 72 / 41 mmHg   01/22/2018 10:11 Blood Pressure: 108 / 45 mmHg   01/22/2018 10:10 Blood Pressure: 71 / 37 mmHg   01/21/2018 10:54 Blood Pressure: 147 / 58 mmHg   Noted goals of care discussed with son earlier in the week and order out to hospice - plan for hospice to meet with family next week.     Adjustment insomnia  Nursing reports that patient is requesting help with insomnia. Patient states that she has always been a \"bad sleeper,\" but it is harder while she is here.  She is not awake due to pain, toileting, or anything in particular. Her appetite is appropriate.  She is interested in trying some medication to help during this time that she is here.    REVIEW OF " SYSTEMS:  4 point ROS including Respiratory, CV, GI and , other than that noted in the HPI,  is negative    PMH/PSH reviewed in EPIC today    /70  Pulse 71  Temp 97.5  F (36.4  C)  Resp 20  Wt 123 lb 1.6 oz (55.8 kg)  BMI 21.81 kg/m2  EXAM:  Constitutional: alert, active, no distress, cooperative and pale   Cardiovascular: negative, PMI normal. No lifts, heaves, or thrills. RRR. 3/6 systolic murmur, no clicks gallops or rub. 1+ pitting edema BLE.   Respiratory: negative, Percussion normal. Good diaphragmatic excursion. Lungs clear  Abdomen: negative, Abdomen soft, non-tender. BS normal. No masses, organomegaly    ASSESSMENT/PLAN:    Essential (primary) hypertension  Chronic. Stable. Need more control. Will restart Norvasc.  Chart review shows inpatient team was considering pacermaker for episodes of bradycardia and Norvasc was placed on hold until electrophysiology could come to consult. This didn't happened and now given goals of care,  will not - will meet with hospice on Tuesday (AAA).  Will restart CCB as CCB won't affect HR and will help control BP's better than hydralazine. Parameters written for hydralazine.     Adjustment insomnia  Chronic. Stable. Exacerbated during TCU stay. Will start Melatonin.     1.  Start Norvasc 5 mg po Q pm.  Dx: HTN - d/c current 10 mg Norvasc order that is on hold.  2.  Decrease Hydralazine to 75 mg po TID.  Dx: HTN  3.  Hold Hydralazine dose for GBP < 100.  Dx: orthostatic hypotension  4.  D/c Vit B12, Oscal, MVI, fish oil.  5.  Melatonin 3 mg po QHS.  Dx: insomnia    Patient agreeable to POC.       Electronically signed by:  Gia Hernandez RN BSN CCRN (NP Student)  This patient was seen along with a nurse practitioner student. The histories and ROS were obtained and confirmed by myself. All objective information and Assessment/Plans were completed by myself.    DAMON Sepulveda CNP      Sincerely,        DAMON Sepulveda CNP

## 2018-01-26 NOTE — PROGRESS NOTES
"[place] [facility]  Chief Complaint   Patient presents with     Nursing Home Acute     HPI:    Leigh López is a 89 year old  (3/20/1925), who is being seen today for an episodic care visit at [facility].    HPI information obtained from: facility chart records, facility staff, patient report, [facility] chart review and Care Everywhere [facility] chart review. Today's concern is:    Essential (primary) hypertension  Noting goal for BP control is <140/90 due to AAA. Chart review shows Norvasc has been on hold since readmission, while Hydralazine is 100mg scheduled TID. PRN Clonidine is available for SBP>150 but has not been utilized.  Patient continues to have transient orthostatic episodes of hypotension. Today patient denies dizziness, but does states that sometimes she has mild abdominal pain.  Denies nausea, constipation, fevers, SOB/CP, cough. Chart review shows BP as follows:  01/26/2018 14:39 Blood Pressure: 135 / 66 mmHg   01/25/2018 10:17 Blood Pressure: 146 / 70 mmHg   01/24/2018 10:03 Blood Pressure: 104 / 47 mmHg   01/23/2018 09:01 Blood Pressure: 153 / 65 mmHg   01/22/2018 14:05 Blood Pressure: 159 / 71 mmHg   01/22/2018 10:11 Blood Pressure: 72 / 41 mmHg   01/22/2018 10:11 Blood Pressure: 108 / 45 mmHg   01/22/2018 10:10 Blood Pressure: 71 / 37 mmHg   01/21/2018 10:54 Blood Pressure: 147 / 58 mmHg   Noted goals of care discussed with son earlier in the week and order out to hospice - plan for hospice to meet with family next week.     Adjustment insomnia  Nursing reports that patient is requesting help with insomnia. Patient states that she has always been a \"bad sleeper,\" but it is harder while she is here.  She is not awake due to pain, toileting, or anything in particular. Her appetite is appropriate.  She is interested in trying some medication to help during this time that she is here.    REVIEW OF SYSTEMS:  4 point ROS including Respiratory, CV, GI and , other than that noted in " the HPI,  is negative    PMH/PSH reviewed in EPIC today    /70  Pulse 71  Temp 97.5  F (36.4  C)  Resp 20  Wt 123 lb 1.6 oz (55.8 kg)  BMI 21.81 kg/m2  EXAM:  Constitutional: alert, active, no distress, cooperative and pale   Cardiovascular: negative, PMI normal. No lifts, heaves, or thrills. RRR. 3/6 systolic murmur, no clicks gallops or rub. 1+ pitting edema BLE.   Respiratory: negative, Percussion normal. Good diaphragmatic excursion. Lungs clear  Abdomen: negative, Abdomen soft, non-tender. BS normal. No masses, organomegaly    ASSESSMENT/PLAN:    Essential (primary) hypertension  Chronic. Stable. Need more control. Will restart Norvasc.  Chart review shows inpatient team was considering pacermaker for episodes of bradycardia and Norvasc was placed on hold until electrophysiology could come to consult. This didn't happened and now given goals of care,  will not - will meet with hospice on Tuesday (AAA).  Will restart CCB as CCB won't affect HR and will help control BP's better than hydralazine. Parameters written for hydralazine.     Adjustment insomnia  Chronic. Stable. Exacerbated during TCU stay. Will start Melatonin.     1.  Start Norvasc 5 mg po Q pm.  Dx: HTN - d/c current 10 mg Norvasc order that is on hold.  2.  Decrease Hydralazine to 75 mg po TID.  Dx: HTN  3.  Hold Hydralazine dose for GBP < 100.  Dx: orthostatic hypotension  4.  D/c Vit B12, Oscal, MVI, fish oil.  5.  Melatonin 3 mg po QHS.  Dx: insomnia    Patient agreeable to POC.       Electronically signed by:  Gia Hernandez RN BSN CCRN (NP Student)  This patient was seen along with a nurse practitioner student. The histories and ROS were obtained and confirmed by myself. All objective information and Assessment/Plans were completed by myself.    DAMON Sepulveda CNP

## 2018-01-29 NOTE — PROGRESS NOTES
[facility] now requests orders and shares plan of care/discharge summaries for some patients through [employer].  Please REPLY TO THIS MESSAGE in order to give authorization for orders when needed.  This is considered a verbal order, you will still receive a faxed copy of orders for signature.  Thank you for your assistance in improving collaboration for our patients.    FYI hospice update.  Met with Bhupendra Anand and AB[facility] today 1/29/18, to provide information on hospice services.  Son will speak with other siblings prior to electing hospice services.  Hospice will f/u with Cheng in 1 week.  Facility staff updated.  If questions or concerns please contact hospice main office.  Thanks    Anika Jackson RN, BSN, PHN  Hospice Admission Clinician/PACCT RN [facility]  [address] [place] [address]  [email or web address]  [email or web address]   Office: [phone]   Cell: [phone]

## 2018-02-05 NOTE — LETTER
2/5/2018        RE: Leigh López  [address] [facility] [address]          [place] [facility]    Chief Complaint   Patient presents with     [facility] Regulatory       HPI:    Leigh López is a 89 year old  (3/20/1925), who is being seen today for a federally mandated E/M visit at [facility]  .  HPI information obtained from: facility chart records, facility staff, patient report and [facility] chart review. Today's concerns are:  1. Abdominal aortic aneurysm (AAA) without rupture (H)    2. Benign essential hypertension    3. Hematemesis with nausea    4. Hospice care      [place] [facility] [place] [facility] - goals of care are comfort.   STarted having nausea with emesis last thur - tx with zofran - limited no relief Noted norovirus in SNF>   Pt continues to have coffee ground emesis t/o weekend.   Hospice nurse here today and we consult    ALLERGIES: Diltiazem hcl [diltiazem]; Metoprolol succinate [metoprolol]; and Sulfa drugs  PAST MEDICAL HISTORY:  has no past medical history on file.  PAST SURGICAL HISTORY:  has no past surgical history on file.  FAMILY HISTORY: family history is not on file.  SOCIAL HISTORY:      MEDICATIONS:  Current Outpatient Prescriptions   Medication Sig Dispense Refill     acetaminophen (TYLENOL) 650 MG Suppository Place 650 mg rectally every 4 hours as needed for fever       LORazepam (ATIVAN PO) Take 0.5 mg by mouth 4 times daily Also QID PRN       Prochlorperazine Maleate (COMPAZINE PO) Take 10 mg by mouth every 6 hours as needed for nausea       prochlorperazine (COMPAZINE) 25 MG Suppository Place 25 mg rectally every 12 hours as needed for nausea       MELATONIN PO Take 3 mg by mouth At Bedtime       morphine sulfate HIGH CONCENTRATE (ROXANOL *CONCENTRATED*) 20 mg/mL (HIGH CONC) solution Take 5 mg by mouth every 4 hours       morphine sulfate HIGH CONCENTRATE (ROXANOL *CONCENTRATED*) 20 mg/mL (HIGH CONC) solution Take 5 mg by mouth every hour  as needed for shortness of breath / dyspnea or moderate to severe pain       omeprazole (PRILOSEC) 2 mg/mL SUSP Take 40 mg by mouth 2 times daily       Acetaminophen (TYLENOL PO) Take 650 mg by mouth every 4 hours as needed for mild pain or fever       Irbesartan (AVAPRO PO) Take 150 mg by mouth daily       HYDRALAZINE HCL PO Take 75 mg by mouth 3 times daily        polyethylene glycol (MIRALAX/GLYCOLAX) powder Take 17 g by mouth daily as needed for constipation       Nitroglycerin (NITROSTAT SL) Place 0.4 mg under the tongue every 5 minutes as needed for chest pain       AmLODIPine Besylate (NORVASC PO) Take 5 mg by mouth daily        Levothyroxine Sodium (SYNTHROID PO) Take 50 mcg by mouth daily       Menthol, Topical Analgesic, (BIOFREEZE EX) Externally apply topically 2 times daily as needed        CLONIDINE HCL PO Take 0.1 mg by mouth 2 times daily as needed       Medications reviewed:  Medications reconciled to facility chart and changes were made to reflect current medications as identified as above med list. Below are the changes that were made:   Medications stopped since last EPIC medication reconciliation:   Medications Discontinued During This Encounter   Medication Reason     Ascorbic Acid (VITAMIN C PO) Medication Reconciliation Clean Up     Acetaminophen (TYLENOL EXTRA STRENGTH PO) Medication Reconciliation Clean Up     CRANBERRY EXTRACT PO Medication Reconciliation Clean Up       Medications started since last [facility] medication reconciliation:  Orders Placed This Encounter   Medications     acetaminophen (TYLENOL) 650 MG Suppository     Sig: Place 650 mg rectally every 4 hours as needed for fever     LORazepam (ATIVAN PO)     Sig: Take 0.5 mg by mouth 4 times daily Also QID PRN     Prochlorperazine Maleate (COMPAZINE PO)     Sig: Take 10 mg by mouth every 6 hours as needed for nausea     prochlorperazine (COMPAZINE) 25 MG Suppository     Sig: Place 25 mg rectally every 12 hours as needed for nausea      MELATONIN PO     Sig: Take 3 mg by mouth At Bedtime     morphine sulfate HIGH CONCENTRATE (ROXANOL *CONCENTRATED*) 20 mg/mL (HIGH CONC) solution     Sig: Take 5 mg by mouth every 4 hours     morphine sulfate HIGH CONCENTRATE (ROXANOL *CONCENTRATED*) 20 mg/mL (HIGH CONC) solution     Sig: Take 5 mg by mouth every hour as needed for shortness of breath / dyspnea or moderate to severe pain     omeprazole (PRILOSEC) 2 mg/mL SUSP     Sig: Take 40 mg by mouth 2 times daily     Acetaminophen (TYLENOL PO)     Sig: Take 650 mg by mouth every 4 hours as needed for mild pain or fever       Case Management:  I have reviewed the care plan and MDS and do agree with the plan. Patient's desire to return to the community is present, but is not able due to care needs .  Information reviewed:  Medications, vital signs, orders, and nursing notes.    ROS:  Unobtainable secondary to cognitive impairment or aphasia.    Exam:  Vitals: /50  Pulse 95  Temp 97.6  F (36.4  C)  Resp 16  Wt 123 lb 1.6 oz (55.8 kg)  SpO2 (!) 61%  BMI 21.81 kg/m2  BMI= Body mass index is 21.81 kg/(m^2).  Resting comfortable today  -   Non labored breathing,   No edema  Warm CMS to giovanny arms + cms,     Lab/Diagnostic data:   na    ASSESSMENT/PLAN     Abdominal aortic aneurysm (AAA) without rupture (H)  Benign essential hypertension  Hematemesis with nausea  Hospice care     Reviewed BP's -   Reviewed meds - DC vit C and cranberry as possible adding lower ph to stomach   - add ppi for comfort and possible GI bleed    Hospice nurse notes morphine and ativan helping with comfort - will increase.     Orders:  1.  D/c vitamin C.  2.  D/c cranberry extract.  3.  D/c scheduled extra strength tylenol.  4.  D/c PRN Tylenol 1000 mg QD PRN - use hospice OTTO.  5.  Start Omeprazole 40 mg po BID x 3 days, then QD. (pharmacy to dose in liquid form).  Dx: PUD/bleed  6.  Start Morphine concentrate 20 mg/mL - give 5 mg po Q4H scheduled and 5 mg po Q1H PRN.  Dx:  hospice  7.  Start Ativan 0.5 mg po QID scheduled and change PRN to 5 mg po QID PRN.  Dx: anxiety, nausea  8.  Start Compazine 10 mg po Q6H PRN or Compazine 25 mg SUBHASH BID PRN (use if ativan not effective)    Total time spent with patient visit was 25 min including patient visit, review of past records and f2f with hospice nurse. Greater than 50% of total time spent with counseling and coordinating care due to review of meds and updates.        Electronically signed by:  DAMON Sepulveda CNP        Sincerely,        DAMON Sepulveda CNP

## 2018-02-05 NOTE — PROGRESS NOTES
[place] [facility]    Chief Complaint   Patient presents with     assisted Regulatory       HPI:    Leigh López is a 89 year old  (3/20/1925), who is being seen today for a federally mandated E/M visit at [facility]  .  HPI information obtained from: facility chart records, facility staff, patient report and [facility] chart review. Today's concerns are:  1. Abdominal aortic aneurysm (AAA) without rupture (H)    2. Benign essential hypertension    3. Hematemesis with nausea    4. Hospice care      [place] [facility] [place] [facility] - goals of care are comfort.   STarted having nausea with emesis last thur - tx with zofran - limited no relief Noted norovirus in SNF>   Pt continues to have coffee ground emesis t/o weekend.   Hospice nurse here today and we consult    ALLERGIES: Diltiazem hcl [diltiazem]; Metoprolol succinate [metoprolol]; and Sulfa drugs  PAST MEDICAL HISTORY:  has no past medical history on file.  PAST SURGICAL HISTORY:  has no past surgical history on file.  FAMILY HISTORY: family history is not on file.  SOCIAL HISTORY:      MEDICATIONS:  Current Outpatient Prescriptions   Medication Sig Dispense Refill     acetaminophen (TYLENOL) 650 MG Suppository Place 650 mg rectally every 4 hours as needed for fever       LORazepam (ATIVAN PO) Take 0.5 mg by mouth 4 times daily Also QID PRN       Prochlorperazine Maleate (COMPAZINE PO) Take 10 mg by mouth every 6 hours as needed for nausea       prochlorperazine (COMPAZINE) 25 MG Suppository Place 25 mg rectally every 12 hours as needed for nausea       MELATONIN PO Take 3 mg by mouth At Bedtime       morphine sulfate HIGH CONCENTRATE (ROXANOL *CONCENTRATED*) 20 mg/mL (HIGH CONC) solution Take 5 mg by mouth every 4 hours       morphine sulfate HIGH CONCENTRATE (ROXANOL *CONCENTRATED*) 20 mg/mL (HIGH CONC) solution Take 5 mg by mouth every hour as needed for shortness of breath / dyspnea or moderate to severe pain        omeprazole (PRILOSEC) 2 mg/mL SUSP Take 40 mg by mouth 2 times daily       Acetaminophen (TYLENOL PO) Take 650 mg by mouth every 4 hours as needed for mild pain or fever       Irbesartan (AVAPRO PO) Take 150 mg by mouth daily       HYDRALAZINE HCL PO Take 75 mg by mouth 3 times daily        polyethylene glycol (MIRALAX/GLYCOLAX) powder Take 17 g by mouth daily as needed for constipation       Nitroglycerin (NITROSTAT SL) Place 0.4 mg under the tongue every 5 minutes as needed for chest pain       AmLODIPine Besylate (NORVASC PO) Take 5 mg by mouth daily        Levothyroxine Sodium (SYNTHROID PO) Take 50 mcg by mouth daily       Menthol, Topical Analgesic, (BIOFREEZE EX) Externally apply topically 2 times daily as needed        CLONIDINE HCL PO Take 0.1 mg by mouth 2 times daily as needed       Medications reviewed:  Medications reconciled to facility chart and changes were made to reflect current medications as identified as above med list. Below are the changes that were made:   Medications stopped since last EPIC medication reconciliation:   Medications Discontinued During This Encounter   Medication Reason     Ascorbic Acid (VITAMIN C PO) Medication Reconciliation Clean Up     Acetaminophen (TYLENOL EXTRA STRENGTH PO) Medication Reconciliation Clean Up     CRANBERRY EXTRACT PO Medication Reconciliation Clean Up       Medications started since last [facility] medication reconciliation:  Orders Placed This Encounter   Medications     acetaminophen (TYLENOL) 650 MG Suppository     Sig: Place 650 mg rectally every 4 hours as needed for fever     LORazepam (ATIVAN PO)     Sig: Take 0.5 mg by mouth 4 times daily Also QID PRN     Prochlorperazine Maleate (COMPAZINE PO)     Sig: Take 10 mg by mouth every 6 hours as needed for nausea     prochlorperazine (COMPAZINE) 25 MG Suppository     Sig: Place 25 mg rectally every 12 hours as needed for nausea     MELATONIN PO     Sig: Take 3 mg by mouth At Bedtime     morphine sulfate  HIGH CONCENTRATE (ROXANOL *CONCENTRATED*) 20 mg/mL (HIGH CONC) solution     Sig: Take 5 mg by mouth every 4 hours     morphine sulfate HIGH CONCENTRATE (ROXANOL *CONCENTRATED*) 20 mg/mL (HIGH CONC) solution     Sig: Take 5 mg by mouth every hour as needed for shortness of breath / dyspnea or moderate to severe pain     omeprazole (PRILOSEC) 2 mg/mL SUSP     Sig: Take 40 mg by mouth 2 times daily     Acetaminophen (TYLENOL PO)     Sig: Take 650 mg by mouth every 4 hours as needed for mild pain or fever       Case Management:  I have reviewed the care plan and MDS and do agree with the plan. Patient's desire to return to the community is present, but is not able due to care needs .  Information reviewed:  Medications, vital signs, orders, and nursing notes.    ROS:  Unobtainable secondary to cognitive impairment or aphasia.    Exam:  Vitals: /50  Pulse 95  Temp 97.6  F (36.4  C)  Resp 16  Wt 123 lb 1.6 oz (55.8 kg)  SpO2 (!) 61%  BMI 21.81 kg/m2  BMI= Body mass index is 21.81 kg/(m^2).  Resting comfortable today  -   Non labored breathing,   No edema  Warm CMS to giovanny arms + cms,     Lab/Diagnostic data:   na    ASSESSMENT/PLAN     Abdominal aortic aneurysm (AAA) without rupture (H)  Benign essential hypertension  Hematemesis with nausea  Hospice care     Reviewed BP's -   Reviewed meds - DC vit C and cranberry as possible adding lower ph to stomach   - add ppi for comfort and possible GI bleed    Hospice nurse notes morphine and ativan helping with comfort - will increase.     Orders:  1.  D/c vitamin C.  2.  D/c cranberry extract.  3.  D/c scheduled extra strength tylenol.  4.  D/c PRN Tylenol 1000 mg QD PRN - use hospice OTTO.  5.  Start Omeprazole 40 mg po BID x 3 days, then QD. (pharmacy to dose in liquid form).  Dx: PUD/bleed  6.  Start Morphine concentrate 20 mg/mL - give 5 mg po Q4H scheduled and 5 mg po Q1H PRN.  Dx: hospice  7.  Start Ativan 0.5 mg po QID scheduled and change PRN to 5 mg po QID  PRN.  Dx: anxiety, nausea  8.  Start Compazine 10 mg po Q6H PRN or Compazine 25 mg SUBHASH BID PRN (use if ativan not effective)    Total time spent with patient visit was 25 min including patient visit, review of past records and f2f with hospice nurse. Greater than 50% of total time spent with counseling and coordinating care due to review of meds and updates.        Electronically signed by:  DAMON Sepulveda CNP

## 2018-02-06 NOTE — Clinical Note
Not sure if this is appropriate for CPT code 31799 and no E/M code, if not move it to a [facility] [address] E/M CPT code.  thanks

## 2018-02-06 NOTE — LETTER
2/6/2018        RE: Leigh López  [address] [facility] [address]        [place] [facility]    Chief Complaint   Patient presents with     Nursing Home Acute       HPI:    Leigh López is a 89 year old  (3/20/1925), who is being seen today for an episodic care visit at [facility].    HPI information obtained from: {FGS HPI:505633}. Today's concern is:  {FGS DX:183180}    REVIEW OF SYSTEMS:  {ROS FGS:568741}    BP (!) 73/58  Pulse 95  Temp 97.6  F (36.4  C)  Resp 16  Wt 123 lb 1.6 oz (55.8 kg)  SpO2 (!) 61%  BMI 21.81 kg/m2  GENERAL APPEARANCE:  Alert, in no distress  ***    ASSESSMENT/PLAN:  {FGS DX:485104}    {FGS TIME SPENT:565882}    Electronically signed by:  Keshia Corrales MA      Sincerely,        DAMON Sepulveda CNP

## 2018-02-07 VITALS
WEIGHT: 123.1 LBS | TEMPERATURE: 97.6 F | SYSTOLIC BLOOD PRESSURE: 73 MMHG | BODY MASS INDEX: 21.81 KG/M2 | HEART RATE: 95 BPM | DIASTOLIC BLOOD PRESSURE: 58 MMHG | OXYGEN SATURATION: 61 % | RESPIRATION RATE: 16 BRPM

## 2018-02-07 NOTE — PROGRESS NOTES
"[place] [facility]    Chief Complaint   Patient presents with     Nursing Home Acute       HPI:    Leigh López is a 89 year old  (3/20/1925), who is being seen today for an episodic care visit at [facility].    HPI information obtained from: family/first contact son Cheng and his wife report. Today's concern is:  1. Abdominal aortic aneurysm (AAA) without rupture (H)    2. Hematemesis with nausea    3. Hospice care    pt started hospice and having GI distress - GIb likely.  Son Cheng and his wife here today and asking to talk to me and goals of care/acp.     REVIEW OF SYSTEMS:  Unobtainable secondary to cognitive impairment - pt is non responsive.     BP (!) 73/58  Pulse 95  Temp 97.6  F (36.4  C)  Resp 16  Wt 123 lb 1.6 oz (55.8 kg)  SpO2 (!) 61%  BMI 21.81 kg/m2  Pt resting comfortable - no mottling - non labored breathing - appears comfortable but non responsive    ASSESSMENT/PLAN:     Abdominal aortic aneurysm (AAA) without rupture (H)  Hematemesis with nausea  Hospice care     F2F discussion with son Cheng and his wife for 18 min (12:05 - 12: 23).   Discussion about life expectancy, (anytime from now to 2 weeks pending if her GIB/anemia/AAA)  Answered their questions about how this isn't a AAA bleed in her GI tract signs of bleeding.   Discussed need for HGB in body for life and unknown her current level - discussed and looked up last in [facility] and discussed findings and how that relates to today.   Dicussed the dying process and encouraged him to talk to his mother even though it isn't clear she can't hear. Encouraged him to let her know it is \"ok to go\"      Noted son did do that around 1:30 and pt passed at 2:30 that day.     Electronically signed by:  DAMON Sepulveda CNP  "